# Patient Record
(demographics unavailable — no encounter records)

---

## 2025-05-09 NOTE — ASSESSMENT
[FreeTextEntry1] : # Constipation -Start Miralax qday  # N/V, epigastric pain Potentialy constipation related vs GB stones. Prior UBT reportedly neg. Prior OSH EGD/COL unrevealing.  -Miralax as above -Get OSH records -Stop Pantoprazole 40 as not helpful  # GB stones -Get OSH records  FU 6 weeks

## 2025-05-09 NOTE — PHYSICAL EXAM
[Alert] : alert [Normal Voice/Communication] : normal voice/communication [No Acute Distress] : no acute distress [No Respiratory Distress] : no respiratory distress [No Acc Muscle Use] : no accessory muscle use [None] : no edema [No Masses] : no abdominal mass palpated [Abdomen Soft] : soft [de-identified] : Prior PEG site. Vague multifocal mild TTP w/o peritoneal signs. Inc percussion note.

## 2025-06-19 NOTE — HISTORY OF PRESENT ILLNESS
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 Here for establishment of care for her respiratory conditions.   Moved from [place] to [place] so had to find a new pulm. Was followed by Dr. Martínez Young in [place] Also Saw a pulm at [facility] back in 2015   Developed asthma as a teenager but it only became a problem for her after pregnancy in 1984. Since then has had multiple asthma exacerbations. Currently taking nebulized budesonide, albuterol and ipratropium. No longer on MDI/DPI inhalers as recommended by pulmonologist due to inability to generate sufficient inspiratory flow.  Can walk 1 mile with 1-2 breaks, + PARADA, has to take a lot of breaks when walking on an incline  Uses O2 with her CPAP only at night since 2021

## 2025-06-19 NOTE — PHYSICAL EXAM
[No Acute Distress] : no acute distress [Normal Oropharynx] : normal oropharynx [Normal Appearance] : normal appearance [No Neck Mass] : no neck mass [Clear to Auscultation Bilaterally] : clear to auscultation bilaterally [No Clubbing] : no clubbing [No Edema] : no edema [Oriented x3] : oriented x3 [Normal Affect] : normal affect [TextBox_11] : dysphonia, hearing loss [TextBox_44] : post-tracheostomy scar [TextBox_54] : mechanical valve sounds [TextBox_105] : reticular veins and cyanotic discoloration of both legs [TextBox_132] : heard of hearing

## 2025-06-19 NOTE — HISTORY OF PRESENT ILLNESS
[de-identified] : -EGD 8/2023: for dyspepsia and nausea, mild chronic active gastritis (?reaction to carafate) -EGD 8/2022: for dyspepsia and nausea, mild chronic gastritis  [FreeTextEntry1] : -COL 8/2022: non bleeding AVM, hemorrhoids [de-identified] : -HIDA 11/2024: NL, EF81% [de-identified] : -Abd US 11/2024: GB stones, nl CBD, no e/o CCY

## 2025-06-19 NOTE — ASSESSMENT
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 Here for establishment of care for her respiratory conditions.  # Asthma/COPD # PHTN # KALEY # Heart disease  We need outside records and baseline testing  - PFT - CXR ordered - CMP/CBC ordered, pt. wants to do it at [facility] and will either fax us results or bring them for next visit - Pulm HTN referral to Dr. Etienne  - Will obtain records from previous MD:  Martínez Young MD [address], [place] [address] [phone]  FU 2 weeks

## 2025-06-19 NOTE — ASSESSMENT
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 Here for establishment of care for her respiratory conditions.  # Asthma/COPD # PHTN # KALEY # Heart disease  We need outside records and baseline testing  - PFT - CXR ordered - CMP/CBC ordered, pt. wants to do it at [facility] and will either fax us results or bring them for next visit - Pulm HTN referral to Dr. Etienne  - Will obtain records from previous MD:  Martínez Young MD [address], [place] [address] [phone]  FU 2 weeks

## 2025-06-19 NOTE — ASSESSMENT
[FreeTextEntry1] : # Constipation -Cont Miralax, encouraged regular use   # Nausea, dyspepsia EGD 2022 and 2023 for sx unrevealing.  -On PPI, stop Protonix as feels not helping -Check UBT in 2 weeks as walk in -Consider DGBI vs GB stones vs gastroparesis  # GB stones, NL HIDA -Conservative mgt for now  FU pending workup

## 2025-06-19 NOTE — HISTORY OF PRESENT ILLNESS
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 Here for establishment of care for her respiratory conditions.   Moved from [place] to [place] so had to find a new pulm. Was followed by Dr. Martínez Young in [place] Also Saw a pulm at [facility] back in 2015   Developed asthma as a teenager but it only became a problem for her after pregnancy in 1984. Since then has had multiple asthma exacerbations. Currently taking nebulized budesonide, albuterol and ipratropium. No longer on MDI/DPI inhalers as recommended by pulmonologist due to inability to generate sufficient inspiratory flow.  Can walk 1 mile with 1-2 breaks, + PARADA, has to take a lot of breaks when walking on an incline  Uses O2 with her CPAP only at night since 2021